# Patient Record
Sex: MALE | Race: WHITE | NOT HISPANIC OR LATINO | URBAN - METROPOLITAN AREA
[De-identification: names, ages, dates, MRNs, and addresses within clinical notes are randomized per-mention and may not be internally consistent; named-entity substitution may affect disease eponyms.]

---

## 2020-06-15 ENCOUNTER — OFFICE VISIT (OUTPATIENT)
Dept: URBAN - METROPOLITAN AREA SURGERY CENTER 31 | Facility: SURGERY CENTER | Age: 55
End: 2020-06-15

## 2020-07-08 ENCOUNTER — OFFICE VISIT (OUTPATIENT)
Dept: URBAN - METROPOLITAN AREA SURGERY CENTER 31 | Facility: SURGERY CENTER | Age: 55
End: 2020-07-08
Payer: COMMERCIAL

## 2020-07-08 DIAGNOSIS — K22.70 BARRETT ESOPHAGUS: ICD-10-CM

## 2020-07-08 PROCEDURE — 43239 EGD BIOPSY SINGLE/MULTIPLE: CPT | Performed by: INTERNAL MEDICINE

## 2020-07-08 PROCEDURE — G8907 PT DOC NO EVENTS ON DISCHARG: HCPCS | Performed by: INTERNAL MEDICINE

## 2020-07-08 RX ORDER — WARFARIN SODIUM 7.5 MG/1
TAKE 1 TABLET (7.5 MG) BY ORAL ROUTE ONCE DAILY TABLET ORAL 1
Qty: 0 | Refills: 0 | Status: ACTIVE | COMMUNITY
Start: 1900-01-01 | End: 1900-01-01

## 2020-07-08 RX ORDER — OMEPRAZOLE 40 MG/1
CAPSULE, DELAYED RELEASE PELLETS ORAL
Qty: 0 | Refills: 0 | Status: ACTIVE | COMMUNITY
Start: 1900-01-01 | End: 1900-01-01

## 2022-11-02 ENCOUNTER — WEB ENCOUNTER (OUTPATIENT)
Dept: URBAN - METROPOLITAN AREA CLINIC 128 | Facility: CLINIC | Age: 57
End: 2022-11-02

## 2022-11-02 ENCOUNTER — DASHBOARD ENCOUNTERS (OUTPATIENT)
Age: 57
End: 2022-11-02

## 2022-11-02 ENCOUNTER — OFFICE VISIT (OUTPATIENT)
Dept: URBAN - METROPOLITAN AREA CLINIC 128 | Facility: CLINIC | Age: 57
End: 2022-11-02
Payer: COMMERCIAL

## 2022-11-02 VITALS
HEIGHT: 68 IN | TEMPERATURE: 98.1 F | SYSTOLIC BLOOD PRESSURE: 142 MMHG | DIASTOLIC BLOOD PRESSURE: 84 MMHG | BODY MASS INDEX: 43.5 KG/M2 | WEIGHT: 287 LBS

## 2022-11-02 DIAGNOSIS — Z51.81 ENCOUNTER FOR THERAPEUTIC DRUG LEVEL MONITORING: ICD-10-CM

## 2022-11-02 DIAGNOSIS — Z12.11 COLON CANCER SCREENING: ICD-10-CM

## 2022-11-02 DIAGNOSIS — K21.9 GASTROESOPHAGEAL REFLUX DISEASE, UNSPECIFIED WHETHER ESOPHAGITIS PRESENT: ICD-10-CM

## 2022-11-02 DIAGNOSIS — Z79.01 LONG TERM (CURRENT) USE OF ANTICOAGULANTS: ICD-10-CM

## 2022-11-02 DIAGNOSIS — K22.70 BARRETT'S ESOPHAGUS WITHOUT DYSPLASIA: ICD-10-CM

## 2022-11-02 PROBLEM — 302914006: Status: ACTIVE | Noted: 2022-11-02

## 2022-11-02 PROBLEM — 235595009: Status: ACTIVE | Noted: 2022-11-02

## 2022-11-02 PROCEDURE — 99214 OFFICE O/P EST MOD 30 MIN: CPT | Performed by: INTERNAL MEDICINE

## 2022-11-02 RX ORDER — OMEPRAZOLE 40 MG/1
CAPSULE, DELAYED RELEASE PELLETS ORAL
Qty: 0 | Refills: 0 | Status: ACTIVE | COMMUNITY
Start: 1900-01-01

## 2022-11-02 RX ORDER — WARFARIN SODIUM 7.5 MG/1
TAKE 1 TABLET (7.5 MG) BY ORAL ROUTE ONCE DAILY TABLET ORAL 1
Qty: 0 | Refills: 0 | Status: ACTIVE | COMMUNITY
Start: 1900-01-01

## 2022-11-02 RX ORDER — POLYETHYLENE GLYOCOL 3350, SODIUM CHLORIDE, SODIUM BICARBONATE AND POTASSIUM CHLORIDE 420; 11.2; 5.72; 1.48 G/4L; G/4L; G/4L; G/4L
CLEAR LIQUIDS ALL DAY THEN HALF OF PREP AT 5PM, SECOND HALF AT 10PM POWDER, FOR SOLUTION NASOGASTRIC; ORAL ONCE
Qty: 4 LITERS | Refills: 0 | OUTPATIENT
Start: 2022-11-02 | End: 2022-11-03

## 2022-11-02 NOTE — HPI-TODAY'S VISIT:
Mr. Flores comes in today for evaluation.  He has hx of long segment Barretts.  His GERD is under good control on omeprazole daily.  He gained alot of weight over covid.  There is hx of Liver Transplantation and he offers that he is doing well on current anti-rejection regimen.  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  Colon cancer screening has been rec'd at 5 year interval given OLT and immunosuppressive therapy.  He is due for Barretts Surveillance and Screening Colonoscopy at this time.  NO bleeding. BMs daily and regular.  No renal or pulmonary disease.  No cardiac disease.

## 2022-11-04 PROBLEM — 302914006 BARRETT ESOPHAGUS: Status: ACTIVE | Noted: 2022-11-04

## 2022-11-28 ENCOUNTER — TELEPHONE ENCOUNTER (OUTPATIENT)
Dept: URBAN - METROPOLITAN AREA CLINIC 92 | Facility: CLINIC | Age: 57
End: 2022-11-28

## 2022-11-28 PROBLEM — 711150003: Status: ACTIVE | Noted: 2022-11-02

## 2022-11-28 RX ORDER — OMEPRAZOLE 40 MG/1
CAPSULE, DELAYED RELEASE PELLETS ORAL
Qty: 0 | Refills: 0 | Status: ACTIVE | COMMUNITY
Start: 1900-01-01

## 2022-11-28 RX ORDER — ENOXAPARIN SODIUM 80 MG/.8ML
AS DIRECTED INJECTION SUBCUTANEOUS
Qty: 18 | Refills: 0 | OUTPATIENT
Start: 2022-11-28

## 2022-11-28 RX ORDER — WARFARIN SODIUM 7.5 MG/1
TAKE 1 TABLET (7.5 MG) BY ORAL ROUTE ONCE DAILY TABLET ORAL 1
Qty: 0 | Refills: 0 | Status: ACTIVE | COMMUNITY
Start: 1900-01-01

## 2022-12-06 ENCOUNTER — CLAIMS CREATED FROM THE CLAIM WINDOW (OUTPATIENT)
Dept: URBAN - METROPOLITAN AREA CLINIC 4 | Facility: CLINIC | Age: 57
End: 2022-12-06
Payer: COMMERCIAL

## 2022-12-06 ENCOUNTER — OFFICE VISIT (OUTPATIENT)
Dept: URBAN - METROPOLITAN AREA SURGERY CENTER 31 | Facility: SURGERY CENTER | Age: 57
End: 2022-12-06

## 2022-12-06 ENCOUNTER — CLAIMS CREATED FROM THE CLAIM WINDOW (OUTPATIENT)
Dept: URBAN - METROPOLITAN AREA SURGERY CENTER 31 | Facility: SURGERY CENTER | Age: 57
End: 2022-12-06
Payer: COMMERCIAL

## 2022-12-06 DIAGNOSIS — K22.89 OTHER SPECIFIED DISEASE OF ESOPHAGUS: ICD-10-CM

## 2022-12-06 DIAGNOSIS — K22.70 BARRETT ESOPHAGUS: ICD-10-CM

## 2022-12-06 DIAGNOSIS — Z12.11 COLON CANCER SCREENING: ICD-10-CM

## 2022-12-06 DIAGNOSIS — K63.89 OTHER SPECIFIED DISEASES OF INTESTINE: ICD-10-CM

## 2022-12-06 DIAGNOSIS — K21.9 ACID REFLUX: ICD-10-CM

## 2022-12-06 DIAGNOSIS — K63.89 BACTERIAL OVERGROWTH SYNDROME: ICD-10-CM

## 2022-12-06 PROCEDURE — 88305 TISSUE EXAM BY PATHOLOGIST: CPT | Performed by: PATHOLOGY

## 2022-12-06 PROCEDURE — G8907 PT DOC NO EVENTS ON DISCHARG: HCPCS | Performed by: INTERNAL MEDICINE

## 2022-12-06 PROCEDURE — 45380 COLONOSCOPY AND BIOPSY: CPT | Performed by: INTERNAL MEDICINE

## 2022-12-06 PROCEDURE — 43239 EGD BIOPSY SINGLE/MULTIPLE: CPT | Performed by: INTERNAL MEDICINE

## 2022-12-06 PROCEDURE — 88342 IMHCHEM/IMCYTCHM 1ST ANTB: CPT | Performed by: PATHOLOGY

## 2022-12-06 PROCEDURE — 88341 IMHCHEM/IMCYTCHM EA ADD ANTB: CPT | Performed by: PATHOLOGY

## 2022-12-06 RX ORDER — WARFARIN SODIUM 7.5 MG/1
TAKE 1 TABLET (7.5 MG) BY ORAL ROUTE ONCE DAILY TABLET ORAL 1
Qty: 0 | Refills: 0 | Status: ACTIVE | COMMUNITY
Start: 1900-01-01

## 2022-12-06 RX ORDER — ENOXAPARIN SODIUM 80 MG/.8ML
AS DIRECTED INJECTION SUBCUTANEOUS
Qty: 18 | Refills: 0 | Status: ACTIVE | COMMUNITY
Start: 2022-11-28

## 2022-12-06 RX ORDER — OMEPRAZOLE 40 MG/1
CAPSULE, DELAYED RELEASE PELLETS ORAL
Qty: 0 | Refills: 0 | Status: ACTIVE | COMMUNITY
Start: 1900-01-01

## 2024-09-24 ENCOUNTER — OFFICE VISIT (OUTPATIENT)
Dept: URBAN - METROPOLITAN AREA CLINIC 128 | Facility: CLINIC | Age: 59
End: 2024-09-24

## 2024-09-24 RX ORDER — OMEPRAZOLE 40 MG/1
CAPSULE, DELAYED RELEASE PELLETS ORAL
Qty: 0 | Refills: 0 | Status: ACTIVE | COMMUNITY
Start: 1900-01-01

## 2024-09-24 RX ORDER — ENOXAPARIN SODIUM 80 MG/.8ML
AS DIRECTED INJECTION SUBCUTANEOUS
Qty: 18 | Refills: 0 | Status: ACTIVE | COMMUNITY
Start: 2022-11-28

## 2024-09-24 RX ORDER — WARFARIN SODIUM 7.5 MG/1
TAKE 1 TABLET (7.5 MG) BY ORAL ROUTE ONCE DAILY TABLET ORAL 1
Qty: 0 | Refills: 0 | Status: ACTIVE | COMMUNITY
Start: 1900-01-01

## 2024-11-11 ENCOUNTER — OFFICE VISIT (OUTPATIENT)
Dept: URBAN - METROPOLITAN AREA CLINIC 128 | Facility: CLINIC | Age: 59
End: 2024-11-11
Payer: COMMERCIAL

## 2024-11-11 VITALS
HEART RATE: 79 BPM | TEMPERATURE: 98.2 F | SYSTOLIC BLOOD PRESSURE: 142 MMHG | HEIGHT: 68 IN | BODY MASS INDEX: 42.59 KG/M2 | WEIGHT: 281 LBS | DIASTOLIC BLOOD PRESSURE: 88 MMHG

## 2024-11-11 DIAGNOSIS — K22.710: ICD-10-CM

## 2024-11-11 DIAGNOSIS — K21.9 GASTROESOPHAGEAL REFLUX DISEASE, UNSPECIFIED WHETHER ESOPHAGITIS PRESENT: ICD-10-CM

## 2024-11-11 DIAGNOSIS — Z51.81 ENCOUNTER FOR THERAPEUTIC DRUG LEVEL MONITORING: ICD-10-CM

## 2024-11-11 PROBLEM — 1082771000119100: Status: ACTIVE | Noted: 2024-11-11

## 2024-11-11 PROCEDURE — 99214 OFFICE O/P EST MOD 30 MIN: CPT | Performed by: INTERNAL MEDICINE

## 2024-11-11 RX ORDER — OMEPRAZOLE 40 MG/1
CAPSULE, DELAYED RELEASE PELLETS ORAL
Qty: 0 | Refills: 0 | Status: ACTIVE | COMMUNITY
Start: 1900-01-01

## 2024-11-11 RX ORDER — ENOXAPARIN SODIUM 80 MG/.8ML
AS DIRECTED INJECTION SUBCUTANEOUS
Qty: 18 | Refills: 0 | Status: ACTIVE | COMMUNITY
Start: 2022-11-28

## 2024-11-11 RX ORDER — ENOXAPARIN SODIUM 100 MG/ML
AS DIRECTED INJECTION SUBCUTANEOUS
Qty: 18 APPLICATOR | Refills: 0 | OUTPATIENT
Start: 2024-11-11

## 2024-11-11 RX ORDER — WARFARIN SODIUM 7.5 MG/1
TAKE 1 TABLET (7.5 MG) BY ORAL ROUTE ONCE DAILY TABLET ORAL 1
Qty: 0 | Refills: 0 | Status: ACTIVE | COMMUNITY
Start: 1900-01-01

## 2024-11-11 RX ORDER — OMEPRAZOLE 40 MG/1
1 CAPSULE CAPSULE, DELAYED RELEASE ORAL TWICE A DAY
Qty: 180 CAPSULE | Refills: 3

## 2024-11-11 NOTE — HPI-TODAY'S VISIT:
Mr Flores comes in for a follow up visit.  He is over due for Barretts Surveillance.  One of his Barretts biopsies in late 2022 was positive for LGD.  6 month follow up endoscopy was rec'd. He offers GERD is under good control. Occasional breakthrough waking him from sleep if he eats late or drinks ETOH in the evening.  Weight has gradually increased.  Possibly 5lbs weight loss in recent months.  BMs are regular. NO overt bleeding.  Colon polyp surveillance is next due in 2027.  He has SIMIN and used CPAP nightly.  He offers labs at routine eval in July were all normal.  He takes coumadin for anticoagulation due to hx of PE in the distant past.  Lovenox window has been used on numerous occasions without complication.

## 2024-11-11 NOTE — PHYSICAL EXAM GASTROINTESTINAL
Abdomen soft, over weight, extensive upper abd surgical scar from transplanct, some incisional hernia in epigastrium, nontender, nondistended, no masses palpable , normal bowel sounds   Liver and Spleen , no hepatomegaly present, liver edge nontender , spleen not palpable   Rectal: deferred

## 2024-11-21 ENCOUNTER — OFFICE VISIT (OUTPATIENT)
Dept: URBAN - METROPOLITAN AREA SURGERY CENTER 31 | Facility: SURGERY CENTER | Age: 59
End: 2024-11-21

## 2024-11-26 ENCOUNTER — CLAIMS CREATED FROM THE CLAIM WINDOW (OUTPATIENT)
Dept: URBAN - METROPOLITAN AREA SURGERY CENTER 31 | Facility: SURGERY CENTER | Age: 59
End: 2024-11-26

## 2024-11-26 RX ORDER — ENOXAPARIN SODIUM 80 MG/.8ML
AS DIRECTED INJECTION SUBCUTANEOUS
Qty: 18 | Refills: 0 | Status: ACTIVE | COMMUNITY
Start: 2022-11-28

## 2024-11-26 RX ORDER — WARFARIN SODIUM 7.5 MG/1
TAKE 1 TABLET (7.5 MG) BY ORAL ROUTE ONCE DAILY TABLET ORAL 1
Qty: 0 | Refills: 0 | Status: ACTIVE | COMMUNITY
Start: 1900-01-01

## 2024-11-26 RX ORDER — ENOXAPARIN SODIUM 100 MG/ML
AS DIRECTED INJECTION SUBCUTANEOUS
Qty: 18 APPLICATOR | Refills: 0 | Status: ACTIVE | COMMUNITY
Start: 2024-11-11

## 2024-11-26 RX ORDER — OMEPRAZOLE 40 MG/1
1 CAPSULE CAPSULE, DELAYED RELEASE ORAL TWICE A DAY
Qty: 180 CAPSULE | Refills: 3 | Status: ACTIVE | COMMUNITY

## 2025-01-13 ENCOUNTER — OFFICE VISIT (OUTPATIENT)
Dept: URBAN - METROPOLITAN AREA CLINIC 128 | Facility: CLINIC | Age: 60
End: 2025-01-13

## 2025-01-23 ENCOUNTER — OFFICE VISIT (OUTPATIENT)
Dept: URBAN - METROPOLITAN AREA CLINIC 128 | Facility: CLINIC | Age: 60
End: 2025-01-23
Payer: COMMERCIAL

## 2025-01-23 ENCOUNTER — OFFICE VISIT (OUTPATIENT)
Dept: URBAN - METROPOLITAN AREA CLINIC 128 | Facility: CLINIC | Age: 60
End: 2025-01-23

## 2025-01-23 VITALS
HEART RATE: 96 BPM | HEIGHT: 68 IN | SYSTOLIC BLOOD PRESSURE: 130 MMHG | BODY MASS INDEX: 42.89 KG/M2 | DIASTOLIC BLOOD PRESSURE: 84 MMHG | TEMPERATURE: 97.7 F | WEIGHT: 283 LBS

## 2025-01-23 DIAGNOSIS — K22.710: ICD-10-CM

## 2025-01-23 DIAGNOSIS — Z86.0100 PERSONAL HISTORY OF COLONIC POLYPS: ICD-10-CM

## 2025-01-23 DIAGNOSIS — K21.9 GASTROESOPHAGEAL REFLUX DISEASE, UNSPECIFIED WHETHER ESOPHAGITIS PRESENT: ICD-10-CM

## 2025-01-23 PROCEDURE — 99213 OFFICE O/P EST LOW 20 MIN: CPT | Performed by: PHYSICIAN ASSISTANT

## 2025-01-23 RX ORDER — ENOXAPARIN SODIUM 100 MG/ML
AS DIRECTED INJECTION SUBCUTANEOUS
Qty: 18 APPLICATOR | Refills: 0 | Status: ON HOLD | COMMUNITY
Start: 2024-11-11

## 2025-01-23 RX ORDER — OMEPRAZOLE 40 MG/1
1 CAPSULE CAPSULE, DELAYED RELEASE ORAL TWICE A DAY
Qty: 180 CAPSULE | Refills: 3 | Status: ACTIVE | COMMUNITY

## 2025-01-23 RX ORDER — OMEPRAZOLE 40 MG/1
1 CAPSULE CAPSULE, DELAYED RELEASE ORAL TWICE A DAY
Qty: 180 CAPSULE | Refills: 3

## 2025-01-23 RX ORDER — WARFARIN SODIUM 7.5 MG/1
TAKE 1 TABLET (7.5 MG) BY ORAL ROUTE ONCE DAILY TABLET ORAL 1
Qty: 0 | Refills: 0 | Status: ACTIVE | COMMUNITY
Start: 1900-01-01

## 2025-01-23 NOTE — PHYSICAL EXAM CONSTITUTIONAL:
well developed, well nourished , in no acute distress , ambulating without difficulty , normal communication ability
with RW/fair plus

## 2025-01-23 NOTE — HPI-TODAY'S VISIT:
Mr Flores comes in for a follow up visit.   One of his Barretts biopsies in late 2022 was positive for low grade dysplasia. He offers GERD is under good control. Weight has gradually increased.  Possibly 5lbs weight loss in recent months.  BMs are regular. No overt bleeding.  Colon polyp surveillance is next due in [id number]. Normal colonoscopy in 2022, advised to repet in 3-5 years. He has SIMIN and used CPAP nightly.  He offers labs at routine eval in July were all normal.  He takes coumadin for anticoagulation due to hx of PE in the distant past.  Lovenox window has been used on numerous occasions without complication.  2024 EGD revealed: (A) Esophagus, @39cm, Biopsy (Cold Forceps): MOONEY ESOPHAGUS; NEGATIVE FOR DYSPLASIA (B) Esophagus, @37cm, Biopsy (Cold Forceps): MOONEY ESOPHAGUS; NEGATIVE FOR DYSPLASIA (C) Esophagus, @36cm, Biopsy (Cold Forceps): MOONEY ESOPHAGUS; NEGATIVE FOR DYSPLASIA Acute inflammation (D) Esophagus, @35cm, Biopsy (Cold Forceps): MOONEY ESOPHAGUS; NEGATIVE FOR DYSPLASIA (E) Esophagus, @34cm, Biopsy (Cold Forceps): MOONEY ESOPHAGUS; NEGATIVE FOR DYSPLASIA His GERD Is well controlled on omeprazole bid